# Patient Record
(demographics unavailable — no encounter records)

---

## 2024-11-13 NOTE — CARDIOLOGY SUMMARY
[___] : [unfilled] [LVEF ___%] : LVEF [unfilled]% [de-identified] : 11/11/24: AFib at 67bpm with RBBB [de-identified] : 7/24/24: LV is moderately dilated, LV systolic function is normal with EF 61%, LA/RA dilated, mild to moderate MR, trace AI, Ao root dilated at 4.20cm.

## 2024-11-13 NOTE — DISCUSSION/SUMMARY
[EKG obtained to assist in diagnosis and management of assessed problem(s)] : EKG obtained to assist in diagnosis and management of assessed problem(s) [FreeTextEntry1] : Mr. Sundar Duggan has a history of hypertension,  diet-controlled diabetes, dyslipidemia, and a history of PVC's.  He has a long history of atrial fibrillation, first degree AV block with bifascicular block, RBBB without significant evidence of progression in conduction disease, or symptoms of advanced AV block. In May of 2023, he was discovered him to have significant coronary artery disease, for which he is status post PCI of the LAD, and PCI of the circumflex.  He arrives in no acute distress.  He is euvolemic on exam.  ECG illustrates  rate controlled atrial fibrillation.  Echocardiography was performed July 24, 2024.  This revealed moderate LV dilatation with an ejection fraction of 61%, and mild to moderate mitral regurgitation.  Nuclear stress testing was performed May 8, 2023.  This revealed an inferior defect, suspicious for infarct.  His exercise capacity was 5.6 METS.  His ejection fraction was 59%.  Given the question of infarct versus artifact, I recommended coronary CT angiography.  This revealed severe multivessel coronary artery disease.  Cardiac catheterization was therefore performed May 31, 2023.  This revealed a moderate to severe stenosis of the mid LAD, which appeared visually to be 70%, with a significant wave free ratio.  There was a 90% stenosis of the first obtuse marginal branch of the circumflex.  There was an 80% stenosis of the nondominant RCA.   He will continue Losartan 100mg PO daily and Hydralazine 50mg PO TID for blood pressure control. Will continue Eliquis 5mg PO BID to decrease any thrombo-embolic events with his arrhythmia atrial fibrillation. He will continue Clopidogrel 75mg PO daily as mono therapy for his coronary stenting.   He will continue Simvastatin 40mg PO qHS for an LDL < 100 or < 70 ideally.  He will follow up in 3 months or sooner if needed.

## 2024-11-13 NOTE — PHYSICAL EXAM
[General Appearance - Well Developed] : well developed [Normal Appearance] : normal appearance [Well Groomed] : well groomed [General Appearance - Well Nourished] : well nourished [No Deformities] : no deformities [General Appearance - In No Acute Distress] : no acute distress [Eyelids - No Xanthelasma] : the eyelids demonstrated no xanthelasmas [Normal Oral Mucosa] : normal oral mucosa [No Oral Pallor] : no oral pallor [No Oral Cyanosis] : no oral cyanosis [Normal Jugular Venous A Waves Present] : normal jugular venous A waves present [Normal Jugular Venous V Waves Present] : normal jugular venous V waves present [No Jugular Venous French A Waves] : no jugular venous french A waves [Respiration, Rhythm And Depth] : normal respiratory rhythm and effort [Exaggerated Use Of Accessory Muscles For Inspiration] : no accessory muscle use [Auscultation Breath Sounds / Voice Sounds] : lungs were clear to auscultation bilaterally [Bowel Sounds] : normal bowel sounds [Abdomen Soft] : soft [Abdomen Tenderness] : non-tender [Abdomen Mass (___ Cm)] : no abdominal mass palpated [Abnormal Walk] : normal gait [Nail Clubbing] : no clubbing of the fingernails [Cyanosis, Localized] : no localized cyanosis [Petechial Hemorrhages (___cm)] : no petechial hemorrhages [Skin Color & Pigmentation] : normal skin color and pigmentation [Skin Turgor] : normal skin turgor [] : no rash [No Venous Stasis] : no venous stasis [Oriented To Time, Place, And Person] : oriented to person, place, and time [Impaired Insight] : insight and judgment were intact [Affect] : the affect was normal [Mood] : the mood was normal [No Anxiety] : not feeling anxious [Not Palpable] : not palpable [No Precordial Heave] : no precordial heave was noted [No Gallop] : no gallop heard [I] : a grade 1 [1+] : left 1+ [___ +] : bilateral [unfilled]U+ pretibial pitting edema [Well Developed] : well developed [Well Nourished] : well nourished [No Acute Distress] : no acute distress [Normal Conjunctiva] : normal conjunctiva [Normal Venous Pressure] : normal venous pressure [No Carotid Bruit] : no carotid bruit [Normal Rate] : normal [Irregularly Irregular] : irregularly irregular [Normal S1] : normal S1 [Normal S2] : normal S2 [No Murmur] : no murmurs heard [Clear Lung Fields] : clear lung fields [Good Air Entry] : good air entry [No Respiratory Distress] : no respiratory distress  [Soft] : abdomen soft [Non Tender] : non-tender [No Masses/organomegaly] : no masses/organomegaly [Normal Gait] : normal gait [No Cyanosis] : no cyanosis [No Clubbing] : no clubbing [Edema ___] : edema [unfilled] [No Rash] : no rash [No Skin Lesions] : no skin lesions [Moves all extremities] : moves all extremities [No Focal Deficits] : no focal deficits [Normal Speech] : normal speech [Alert and Oriented] : alert and oriented [Normal memory] : normal memory [Apical Thrill] : no thrill palpable at the apex [Click] : no click [Pericardial Rub] : no pericardial rub [Right Carotid Bruit] : no bruit heard over the right carotid [Left Carotid Bruit] : no bruit heard over the left carotid [Bruit] : no bruit heard [Rt] : no varicose veins of the right leg [Lt] : no varicose veins of the left leg

## 2024-11-13 NOTE — HISTORY OF PRESENT ILLNESS
[FreeTextEntry1] : Mr. Sundar Duggan presented to the office today for a follow-up evaluation.  He was last seen in the office on 9/27/24.  He is now 83 years old, with a history of hypertension, hypercholesterolemia and PVCs. He has a history of a right bundle branch block, left anterior fascicular block, and first-degree AV block for many years, which more recently evolved to atrial fibrillation. He has had a slow ventricular response but has not met criteria to require a pacemaker. He does have a history of anxiety.  He has a history of coronary artery disease, status post PCI to his LAD.  He was seen in the office in April 2023, at which time he was feeling well.  Noting his borderline ejection fraction, I recommended nuclear stress testing.  Nuclear stress testing was performed May 8, 2023.  This revealed an inferior defect, suspicious for infarct.  His exercise capacity was 5.6 METS.  His ejection fraction was 59%.  Given the question of infarct versus artifact, I recommended coronary CT angiography.  This revealed severe multivessel coronary artery disease.  Cardiac catheterization was therefore performed May 31, 2023.  This revealed a moderate to severe stenosis of the mid LAD, which appeared visually to be 70%, with a significant wave free ratio.  There was a 90% stenosis of the first obtuse marginal branch of the circumflex.  There was an 80% stenosis of the non-dominant RCA.  He went on to have PCI of the LAD, with plans for PCI of the obtuse marginal.  He was seen in the office in June 2023, at which time he was feeling well.  He was tolerating his medications without difficulty.  Subsequently, about 1 week ago, he had PCI of the circumflex, which was uneventful.  He was again feeling well at the time of his visit in July 2023 and November, 2023.  He presents to the office today for cardiac follow-up.  He was last seen on 9/26/24. He's concerned with his blood pressure with readings all over the place and being constipated. He's been feeling well overall. He does not report symptoms of angina, heart failure or significant arrhythmia.  He has had some chest discomfort which seems to be related to emotional stress, less significant than in the past. He does not feel any different following his PCI.  He denies any symptoms of dizziness or syncope.  He does report a degree of fatigue with exertion and is not quite sure whether it is safe for him to exercise to a higher level.     He checks his blood pressure at home with a cuff and his readings are from 120's -160's.  He checks his blood pressure 1hr after taking his medication.    Medication reconciliation was performed.  He is compliant with his medications.

## 2024-11-13 NOTE — CARDIOLOGY SUMMARY
[___] : [unfilled] [LVEF ___%] : LVEF [unfilled]% [de-identified] : 11/11/24: AFib at 67bpm with RBBB [de-identified] : 7/24/24: LV is moderately dilated, LV systolic function is normal with EF 61%, LA/RA dilated, mild to moderate MR, trace AI, Ao root dilated at 4.20cm.

## 2024-11-13 NOTE — CARDIOLOGY SUMMARY
[___] : [unfilled] [LVEF ___%] : LVEF [unfilled]% [de-identified] : 11/11/24: AFib at 67bpm with RBBB [de-identified] : 7/24/24: LV is moderately dilated, LV systolic function is normal with EF 61%, LA/RA dilated, mild to moderate MR, trace AI, Ao root dilated at 4.20cm.

## 2024-11-13 NOTE — ADDENDUM
[FreeTextEntry1] : taking hydral tid but it has been difficult to handle constipated has been difficult to fine the perfect place for his bp can consider diuretic some dietary indiscretion

## 2024-11-13 NOTE — REASON FOR VISIT
[Arrhythmia/ECG Abnorrmalities] : arrhythmia/ECG abnormalities [Coronary Artery Disease] : coronary artery disease [Follow-Up - Clinic] : a clinic follow-up of [Chest Pain] : chest pain [Hyperlipidemia] : hyperlipidemia [Hypertension] : hypertension [FreeTextEntry1] : premature beats

## 2025-01-03 NOTE — PHYSICAL EXAM
[Normal Mood and Affect] : normal mood and affect [Able to Communicate] : able to communicate [Well Developed] : well developed [Well Nourished] : well nourished [Extension] : extension [Bending to right] : bending to right [Facet arthropathy] : Facet arthropathy [Disc space narrowing] : Disc space narrowing [Scoliosis] : Scoliosis [Bilateral] : hip with pelvis bilaterally [AP] : anteroposterior [Lateral] : lateral [Moderate arthritis (Tonnis Grade 2)] : Moderate arthritis (Tonnis Grade 2) [] : no SI joint tenderness [FreeTextEntry1] : Left degenerative curve.  Severe spondylosis L1-S1.  DFD L1-S1.  Not much change from previous. [TWNoteComboBox7] : forward flexion 75 degrees [de-identified] : extension 20 degrees [de-identified] : left lateral bending 20 degrees [de-identified] : right lateral bending 20 degrees [FreeTextEntry9] : Unchanged from previous.

## 2025-01-03 NOTE — PLAN
[TextEntry] : We discussed at length with the patient the options for treatment.  We discussed conservative care including physical therapy, acupuncture, massage therapy and chiropractic care.  We discussed injection therapy and even surgical intervention should the patient fail conservative care.  We discussed, risks, benefits, complications, alternatives, outcomes and expectations.   All questions answered.   Patient is being referred for physical therapy for various modalities for his back.   Recommend over the counter medications on as needed basis.

## 2025-01-03 NOTE — HISTORY OF PRESENT ILLNESS
[8] : 8 [Dull/Aching] : dull/aching [Sharp] : sharp [Constant] : constant [Physical therapy] : physical therapy [Walking] : walking [Retired] : Work status: retired [Lower back] : lower back [3] : 3 [de-identified] : 01/03/25:  Patient returns today c/o recurring LBP x last 4 months duration. Fell on 12/19/24 but did not injure his LB at the time.  Getting up from a seated position causes the most pain.  Unable to stand fully erect.  4/22/24  Return visit for this 82 year old male c/o recurring LB and rt hip pain x many years duration. Was seen x 5 months ago with similar pain, went for PT which helps. Pt takes eliquis and cannot take nsaids.  11/6/23: Return visit for this 82 year old male here today for right sided back pain for over the last year.  He has seen Dr. Amaya in the past who prescribed PT.  Does state he had a fall three months ago.    PMH:  Diabetes.  Takes Eliquis.  Previous doc: 1/2/20: Mr. Sundar Duggan, a 78-year-old male, presents today for Back / Right arm.  Reports that few weeks ago he fell down stairs. He reports that he has been experiencing low back pain. He describes  lower back pain over the midline and to b/l paralumbar regions.  He reports that when he fell, he landed on the right arm and has been experiencing right arm/shoulder region pain.  Describes episodes of pain that can have a burning quality. Pain can be located over the lateral upper arm. He reports  that he has experienced pain at rest.  5/12/20: Televisit - Phone encounter 10 minutes [] : Post Surgical Visit: no [FreeTextEntry1] : rb/l hips [FreeTextEntry5] : Right leg pain. Recent stents put in his heart. Fall 3 months ago. Weakness and loss of balance. No injections. Previous achilles tendon surgery. [de-identified] : getting up [de-identified] : Dr Aceves [de-identified] : 4/22/24 [de-identified] : xray [de-identified] : pt

## 2025-01-23 NOTE — DISCUSSION/SUMMARY
[FreeTextEntry1] : Mr. Sundar Duggan has a history of hypertension,  diet-controlled diabetes, dyslipidemia, and a history of PVC's.  He has a long history of atrial fibrillation, first degree AV block with bifascicular block, RBBB without significant evidence of progression in conduction disease, or symptoms of advanced AV block. In May of 2023, he was discovered him to have significant coronary artery disease, for which he is status post PCI of the LAD, and PCI of the circumflex.  He arrives in no acute distress.  He is euvolemic on exam.  ECG illustrates  rate controlled atrial fibrillation.  Echocardiography was performed July 24, 2024.  This revealed moderate LV dilatation with an ejection fraction of 61%, and mild to moderate mitral regurgitation.  Nuclear stress testing was performed May 8, 2023.  This revealed an inferior defect, suspicious for infarct.  His exercise capacity was 5.6 METS.  His ejection fraction was 59%.  Given the question of infarct versus artifact, I recommended coronary CT angiography.  This revealed severe multivessel coronary artery disease.  Cardiac catheterization was therefore performed May 31, 2023.  This revealed a moderate to severe stenosis of the mid LAD, which appeared visually to be 70%, with a significant wave free ratio.  There was a 90% stenosis of the first obtuse marginal branch of the circumflex.  There was an 80% stenosis of the nondominant RCA.   His blood pressure has remained somewhat labile, and is a bit lower today than it has been previously.  We will cautiously continue all his medications.  Additional adjustments may be required going forward.  He will follow up in 3 months or sooner if needed.  [EKG obtained to assist in diagnosis and management of assessed problem(s)] : EKG obtained to assist in diagnosis and management of assessed problem(s)

## 2025-01-23 NOTE — CARDIOLOGY SUMMARY
[___] : [unfilled] [LVEF ___%] : LVEF [unfilled]% [de-identified] : 7/24/24: LV is moderately dilated, LV systolic function is normal with EF 61%, LA/RA dilated, mild to moderate MR, trace AI, Ao root dilated at 4.20cm. [de-identified] : 11/11/24: AFib at 67bpm with RBBB January 23, 2025 atrial fibrillation right bundle branch block

## 2025-01-23 NOTE — PHYSICAL EXAM
[Normal Appearance] : normal appearance [General Appearance - Well Developed] : well developed [Well Groomed] : well groomed [No Deformities] : no deformities [General Appearance - Well Nourished] : well nourished [General Appearance - In No Acute Distress] : no acute distress [Eyelids - No Xanthelasma] : the eyelids demonstrated no xanthelasmas [Normal Oral Mucosa] : normal oral mucosa [No Oral Pallor] : no oral pallor [No Oral Cyanosis] : no oral cyanosis [Normal Jugular Venous A Waves Present] : normal jugular venous A waves present [Normal Jugular Venous V Waves Present] : normal jugular venous V waves present [No Jugular Venous French A Waves] : no jugular venous french A waves [Respiration, Rhythm And Depth] : normal respiratory rhythm and effort [Exaggerated Use Of Accessory Muscles For Inspiration] : no accessory muscle use [Auscultation Breath Sounds / Voice Sounds] : lungs were clear to auscultation bilaterally [Bowel Sounds] : normal bowel sounds [Abdomen Soft] : soft [Abdomen Tenderness] : non-tender [Abdomen Mass (___ Cm)] : no abdominal mass palpated [Abnormal Walk] : normal gait [Cyanosis, Localized] : no localized cyanosis [Nail Clubbing] : no clubbing of the fingernails [Petechial Hemorrhages (___cm)] : no petechial hemorrhages [Skin Color & Pigmentation] : normal skin color and pigmentation [Skin Turgor] : normal skin turgor [No Venous Stasis] : no venous stasis [] : no rash [Oriented To Time, Place, And Person] : oriented to person, place, and time [Impaired Insight] : insight and judgment were intact [Affect] : the affect was normal [Mood] : the mood was normal [No Anxiety] : not feeling anxious [Not Palpable] : not palpable [No Precordial Heave] : no precordial heave was noted [No Gallop] : no gallop heard [I] : a grade 1 [1+] : left 1+ [___ +] : bilateral [unfilled]U+ pretibial pitting edema [Well Developed] : well developed [No Acute Distress] : no acute distress [Well Nourished] : well nourished [Normal Conjunctiva] : normal conjunctiva [Normal Venous Pressure] : normal venous pressure [No Carotid Bruit] : no carotid bruit [Normal Rate] : normal [Irregularly Irregular] : irregularly irregular [Normal S1] : normal S1 [Normal S2] : normal S2 [No Murmur] : no murmurs heard [Clear Lung Fields] : clear lung fields [Good Air Entry] : good air entry [No Respiratory Distress] : no respiratory distress  [Soft] : abdomen soft [Non Tender] : non-tender [No Masses/organomegaly] : no masses/organomegaly [Normal Gait] : normal gait [No Cyanosis] : no cyanosis [No Clubbing] : no clubbing [Edema ___] : edema [unfilled] [No Rash] : no rash [No Skin Lesions] : no skin lesions [Moves all extremities] : moves all extremities [No Focal Deficits] : no focal deficits [Normal Speech] : normal speech [Alert and Oriented] : alert and oriented [Normal memory] : normal memory [Apical Thrill] : no thrill palpable at the apex [Click] : no click [Pericardial Rub] : no pericardial rub [Right Carotid Bruit] : no bruit heard over the right carotid [Left Carotid Bruit] : no bruit heard over the left carotid [Bruit] : no bruit heard [Rt] : no varicose veins of the right leg [Lt] : no varicose veins of the left leg

## 2025-01-23 NOTE — HISTORY OF PRESENT ILLNESS
[FreeTextEntry1] : Mr. Sundar Duggan presented to the office today for a follow-up evaluation.  He was last seen in the office about 2 months ago.  He is now 83 years old, with a history of hypertension, hypercholesterolemia and PVCs. He has a history of a right bundle branch block, left anterior fascicular block, and first-degree AV block for many years, which more recently evolved to atrial fibrillation. He has had a slow ventricular response but has not met criteria to require a pacemaker. He does have a history of anxiety.  He has a history of coronary artery disease, status post PCI to his LAD.  He was seen in the office in April 2023, at which time he was feeling well.  Noting his borderline ejection fraction, I recommended nuclear stress testing.  Nuclear stress testing was performed May 8, 2023.  This revealed an inferior defect, suspicious for infarct.  His exercise capacity was 5.6 METS.  His ejection fraction was 59%.  Given the question of infarct versus artifact, I recommended coronary CT angiography.  This revealed severe multivessel coronary artery disease.  Cardiac catheterization was therefore performed May 31, 2023.  This revealed a moderate to severe stenosis of the mid LAD, which appeared visually to be 70%, with a significant wave free ratio.  There was a 90% stenosis of the first obtuse marginal branch of the circumflex.  There was an 80% stenosis of the non-dominant RCA.  He went on to have PCI of the LAD, with plans for PCI of the obtuse marginal.  He was seen in the office in June 2023, at which time he was feeling well.  He was tolerating his medications without difficulty.  Subsequently, about 1 week ago, he had PCI of the circumflex, which was uneventful.  He was again feeling well at the time of his visit in July 2023 and November, 2023.  He was evaluated in November, 2024, concerned with his labile blood pressures and ongoing issues with constipation.  I did not make any changes at that time.  He presents to the office today for cardiac follow-up.  He was carrying coffee in the dark and tripped on the stairs and fell. He injured his right forehead, with no internal bleeding. He was evaluated in the emergency room, and then discharged. He does not report symptoms of angina, heart failure or significant arrhythmia.   He denies any symptoms of dizziness or syncope.  He does report a degree of fatigue with exertion and is not quite sure whether it is safe for him to exercise to a higher level.   He checks his blood pressure at home with a cuff and his readings are from 120's -160's.  He checks his blood pressure 1hr after taking his medication.

## 2025-03-18 NOTE — DISCUSSION/SUMMARY
[FreeTextEntry1] : Mr. Sundar Duggan has a history of hypertension,  diet-controlled diabetes, dyslipidemia, and a history of PVC's.  He has a long history of atrial fibrillation, first degree AV block with bifascicular block, RBBB without significant evidence of progression in conduction disease, or symptoms of advanced AV block. In May of 2023, he was discovered him to have significant coronary artery disease, for which he is status post PCI of the LAD, and PCI of the circumflex.  He arrives in no acute distress.  He is euvolemic on exam.  ECG illustrates  rate controlled atrial fibrillation.  Echocardiography was performed July 24, 2024.  This revealed moderate LV dilatation with an ejection fraction of 61%, and mild to moderate mitral regurgitation.  Nuclear stress testing was performed May 8, 2023.  This revealed an inferior defect, suspicious for infarct.  His exercise capacity was 5.6 METS.  His ejection fraction was 59%.  Given the question of infarct versus artifact, I recommended coronary CT angiography.  This revealed severe multivessel coronary artery disease.  Cardiac catheterization was therefore performed May 31, 2023.  This revealed a moderate to severe stenosis of the mid LAD, which appeared visually to be 70%, with a significant wave free ratio.  There was a 90% stenosis of the first obtuse marginal branch of the circumflex.  There was an 80% stenosis of the nondominant RCA.   His blood pressure has remained somewhat labile, and is a bit lower today than it has been previously.  We will cautiously continue all his medications.  Additional adjustments may be required going forward.  He will follow up in 3 months or sooner if needed.  I would consider nuclear stress testing at that time. [EKG obtained to assist in diagnosis and management of assessed problem(s)] : EKG obtained to assist in diagnosis and management of assessed problem(s)

## 2025-03-18 NOTE — PHYSICAL EXAM
[General Appearance - Well Developed] : well developed [Normal Appearance] : normal appearance [Well Groomed] : well groomed [General Appearance - Well Nourished] : well nourished [No Deformities] : no deformities [General Appearance - In No Acute Distress] : no acute distress [Eyelids - No Xanthelasma] : the eyelids demonstrated no xanthelasmas [Normal Oral Mucosa] : normal oral mucosa [No Oral Pallor] : no oral pallor [No Oral Cyanosis] : no oral cyanosis [Normal Jugular Venous A Waves Present] : normal jugular venous A waves present [Normal Jugular Venous V Waves Present] : normal jugular venous V waves present [No Jugular Venous French A Waves] : no jugular venous french A waves [Respiration, Rhythm And Depth] : normal respiratory rhythm and effort [Exaggerated Use Of Accessory Muscles For Inspiration] : no accessory muscle use [Auscultation Breath Sounds / Voice Sounds] : lungs were clear to auscultation bilaterally [Bowel Sounds] : normal bowel sounds [Abdomen Soft] : soft [Abdomen Tenderness] : non-tender [Abdomen Mass (___ Cm)] : no abdominal mass palpated [Abnormal Walk] : normal gait [Nail Clubbing] : no clubbing of the fingernails [Cyanosis, Localized] : no localized cyanosis [Petechial Hemorrhages (___cm)] : no petechial hemorrhages [Skin Color & Pigmentation] : normal skin color and pigmentation [Skin Turgor] : normal skin turgor [No Venous Stasis] : no venous stasis [] : no rash [Oriented To Time, Place, And Person] : oriented to person, place, and time [Impaired Insight] : insight and judgment were intact [Affect] : the affect was normal [Mood] : the mood was normal [No Anxiety] : not feeling anxious [Not Palpable] : not palpable [No Precordial Heave] : no precordial heave was noted [No Gallop] : no gallop heard [I] : a grade 1 [1+] : left 1+ [___ +] : bilateral [unfilled]U+ pretibial pitting edema [Well Developed] : well developed [Well Nourished] : well nourished [No Acute Distress] : no acute distress [Normal Conjunctiva] : normal conjunctiva [Normal Venous Pressure] : normal venous pressure [No Carotid Bruit] : no carotid bruit [Normal Rate] : normal [Irregularly Irregular] : irregularly irregular [Normal S1] : normal S1 [Normal S2] : normal S2 [No Murmur] : no murmurs heard [Good Air Entry] : good air entry [Clear Lung Fields] : clear lung fields [No Respiratory Distress] : no respiratory distress  [Soft] : abdomen soft [Non Tender] : non-tender [No Masses/organomegaly] : no masses/organomegaly [Normal Gait] : normal gait [No Cyanosis] : no cyanosis [No Clubbing] : no clubbing [Edema ___] : edema [unfilled] [No Rash] : no rash [No Skin Lesions] : no skin lesions [Moves all extremities] : moves all extremities [No Focal Deficits] : no focal deficits [Normal Speech] : normal speech [Alert and Oriented] : alert and oriented [Normal memory] : normal memory [Apical Thrill] : no thrill palpable at the apex [Click] : no click [Pericardial Rub] : no pericardial rub [Right Carotid Bruit] : no bruit heard over the right carotid [Left Carotid Bruit] : no bruit heard over the left carotid [Bruit] : no bruit heard [Rt] : no varicose veins of the right leg [Lt] : no varicose veins of the left leg

## 2025-03-18 NOTE — HISTORY OF PRESENT ILLNESS
[FreeTextEntry1] : Mr. Sundar Duggan presented to the office today for a follow-up evaluation.  He was last seen in the office about 2 months ago.  He is now 83 years old, with a history of hypertension, hypercholesterolemia and PVCs. He has a history of a right bundle branch block, left anterior fascicular block, and first-degree AV block for many years, which more recently evolved to atrial fibrillation. He has had a slow ventricular response but has not met criteria to require a pacemaker. He does have a history of anxiety.  He has a history of coronary artery disease, status post PCI to his LAD.  He was seen in the office in April 2023, at which time he was feeling well.  Noting his borderline ejection fraction, I recommended nuclear stress testing.  Nuclear stress testing was performed May 8, 2023.  This revealed an inferior defect, suspicious for infarct.  His exercise capacity was 5.6 METS.  His ejection fraction was 59%.  Given the question of infarct versus artifact, I recommended coronary CT angiography.  This revealed severe multivessel coronary artery disease.  Cardiac catheterization was therefore performed May 31, 2023.  This revealed a moderate to severe stenosis of the mid LAD, which appeared visually to be 70%, with a significant wave free ratio.  There was a 90% stenosis of the first obtuse marginal branch of the circumflex.  There was an 80% stenosis of the non-dominant RCA.  He went on to have PCI of the LAD, with plans for PCI of the obtuse marginal.  He was seen in the office in June 2023, at which time he was feeling well.  He was tolerating his medications without difficulty.  Subsequently, about 1 week ago, he had PCI of the circumflex, which was uneventful.  He was again feeling well at the time of his visit in July 2023 and November, 2023.  He was evaluated in November, 2024, concerned with his labile blood pressures and ongoing issues with constipation.  I did not make any changes at that time.  I saw him in January, 2025.  At that time, he had recently presented to the emergency room department after a fall.  He reported ongoing issues with fatigability.  His blood pressure was labile.  He presents to the office today for cardiac follow-up.  He denies any symptoms of dizziness or syncope.  He does report a degree of fatigue with exertion and is not quite sure whether it is safe for him to exercise to a higher level.   He checks his blood pressure at home with a cuff and his readings are from 120's -160's.  He checks his blood pressure 1hr after taking his medication.

## 2025-03-18 NOTE — CARDIOLOGY SUMMARY
[___] : [unfilled] [LVEF ___%] : LVEF [unfilled]% [de-identified] : 11/11/24: AFib at 67bpm with RBBB January 23, 2025 atrial fibrillation right bundle branch block March 18, 2025 atrial fibrillation right bundle branch block [de-identified] : 7/24/24: LV is moderately dilated, LV systolic function is normal with EF 61%, LA/RA dilated, mild to moderate MR, trace AI, Ao root dilated at 4.20cm.

## 2025-04-30 NOTE — PHYSICAL EXAM
[Normal Mood and Affect] : normal mood and affect [Able to Communicate] : able to communicate [Well Developed] : well developed [Well Nourished] : well nourished [Extension] : extension [Bending to right] : bending to right [Facet arthropathy] : Facet arthropathy [Disc space narrowing] : Disc space narrowing [Scoliosis] : Scoliosis [Bilateral] : hip with pelvis bilaterally [AP] : anteroposterior [Lateral] : lateral [Moderate arthritis (Tonnis Grade 2)] : Moderate arthritis (Tonnis Grade 2) [] : no SI joint tenderness [FreeTextEntry1] : Left degenerative curve.  Severe spondylosis L1-S1.  DFD L1-S1.  Not much change from previous. [TWNoteComboBox7] : forward flexion 75 degrees [de-identified] : extension 20 degrees [de-identified] : left lateral bending 20 degrees [de-identified] : right lateral bending 20 degrees [FreeTextEntry9] : Unchanged from previous.

## 2025-04-30 NOTE — HISTORY OF PRESENT ILLNESS
[Lower back] : lower back [8] : 8 [3] : 3 [Dull/Aching] : dull/aching [Sharp] : sharp [Constant] : constant [Physical therapy] : physical therapy [Walking] : walking [Retired] : Work status: retired [de-identified] : 04/28/25:  Patient returns today for recurrent lower back pain.   01/03/25:  Patient returns today c/o recurring LBP x last 4 months duration. Fell on 12/19/24 but did not injure his LB at the time.  Getting up from a seated position causes the most pain.  Unable to stand fully erect.  4/22/24  Return visit for this 82 year old male c/o recurring LB and rt hip pain x many years duration. Was seen x 5 months ago with similar pain, went for PT which helps. Pt takes eliquis and cannot take nsaids.  11/6/23: Return visit for this 82 year old male here today for right sided back pain for over the last year.  He has seen Dr. Amaya in the past who prescribed PT.  Does state he had a fall three months ago.    PMH:  Diabetes.  Takes Eliquis.  Previous doc: 1/2/20: Mr. Sundar Duggan, a 78-year-old male, presents today for Back / Right arm.  Reports that few weeks ago he fell down stairs. He reports that he has been experiencing low back pain. He describes  lower back pain over the midline and to b/l paralumbar regions.  He reports that when he fell, he landed on the right arm and has been experiencing right arm/shoulder region pain.  Describes episodes of pain that can have a burning quality. Pain can be located over the lateral upper arm. He reports  that he has experienced pain at rest.  5/12/20: Televisit - Phone encounter 10 minutes [] : Post Surgical Visit: no [FreeTextEntry1] : rb/l hips [FreeTextEntry5] : Right leg pain. Recent stents put in his heart. Fall 3 months ago. Weakness and loss of balance. No injections. Previous achilles tendon surgery. [de-identified] : getting up [de-identified] : 4/22/24 [de-identified] : Dr Aceves [de-identified] : xray [de-identified] : pt

## 2025-07-21 NOTE — PHYSICAL EXAM
[FreeTextEntry1] : Gen - NAD, Comfortable HEENT - NCAT, EOMI, MMM, PERRLA, Normal Conjunctivae Neck - Supple, No limited ROM Pulm - CTAB Cardiovascular - RRR, S1S2, No murmurs Chest - good chest expansion, good respiratory effort Abdomen - Soft, NT/ND, +BS Extremities - No C/C/E, no calf tenderness /GI- +L inguinal hernia  Neuro-    Cognitive - awake, alert, oriented to person, place, date, year, and situation.  Able  to follow command    Communication - Fluent, Comprehensible, No dysarthria, No aphasia     Cranial Nerves -No facial asymmetry, Tongue midline, EOMI, Shoulder shrug intact    Motor -                    LEFT    UE - ShAB 5/5, EF 5/5, EE 5/5,  5/5                   RIGHT UE - ShAB 5/5, EF 5/5, EE 5/5,   5/5                   LEFT    LE - HF 5/5, KE 5/5, DF 5/5, PF 5/5                   RIGHT LE - HF 5/5, KE 5/5, DF 5/5, PF 5/5       Sensory - Intact  to LT, slight decreased sensation to L foot     Tone - Normal MSK: +back soreness  Psychiatric - Mood stable, Affect WNL

## 2025-07-21 NOTE — ASSESSMENT
[FreeTextEntry1] : Patient advised to continue PT as outpatient. Will consider OT when cleared to remove LSO. - Follow-up with PM&R in 8-12 weeks

## 2025-07-21 NOTE — REVIEW OF SYSTEMS
[FreeTextEntry1] : Constitutional: No fever, No Chills, + fatigue HEENT: No eye pain, No visual disturbances, No difficulty hearing Pulm: No cough, No shortness of breath Cardio: No chest pain, No palpitations GI: No nausea, No vomiting, No diarrhea, No constipation : No dysuria, No frequency, Urgency Neuro: No headaches, No memory loss, No loss of strength, + slight numbness on left foot, No tremors, No dizziness/lightheaded  Skin: No rashes, No lesions  Endo: No temperature intolerance MSK: No joint pain, No joint swelling, No muscle pain, No Neck pain, some back pain Psych:  No depression, No anxiety

## 2025-07-21 NOTE — HISTORY OF PRESENT ILLNESS
[FreeTextEntry1] : Mr. Sundar Duggan is an 83-year-old male patient with past medical history of HTN, HLD, PVC's, RBBB, LAFB, first-degree AV block, A-fib w/slow ventricular response (s/p PPM), CAD (s/p PCI), anxiety, recurrent falls, with previous admission to [facility] for a T8 extension teardrop fracture s/p fall. He was transferred to [facility] for urgent MRI and NSGY consultation. He was then transferred back to [facility] on 6/3/25. Pain was controlled. AP/AC held per orthopedic surgery, to continue hold for 2 weeks and no surgery was advised. Patient was evaluated by PM&R and therapy for functional deficits, gait/ADL impairments and acute rehabilitation was recommended. Patient was discharged and admitted on 6/6/25 to [facility]. He was discharged on 6/24/25 and arrives today for a follow-up evaluation. Still requires assistance with UBD, LBD, and well as with toileting.